# Patient Record
Sex: FEMALE | Race: WHITE
[De-identification: names, ages, dates, MRNs, and addresses within clinical notes are randomized per-mention and may not be internally consistent; named-entity substitution may affect disease eponyms.]

---

## 2017-04-09 ENCOUNTER — HOSPITAL ENCOUNTER (EMERGENCY)
Dept: HOSPITAL 25 - UCCORT | Age: 27
Discharge: HOME | End: 2017-04-09
Payer: SELF-PAY

## 2017-04-09 DIAGNOSIS — N39.0: Primary | ICD-10-CM

## 2017-04-09 DIAGNOSIS — R31.9: ICD-10-CM

## 2017-04-09 DIAGNOSIS — Z88.1: ICD-10-CM

## 2017-04-09 PROCEDURE — 99202 OFFICE O/P NEW SF 15 MIN: CPT

## 2017-04-09 PROCEDURE — 81003 URINALYSIS AUTO W/O SCOPE: CPT

## 2017-04-09 PROCEDURE — G0463 HOSPITAL OUTPT CLINIC VISIT: HCPCS

## 2017-04-09 PROCEDURE — 87086 URINE CULTURE/COLONY COUNT: CPT

## 2017-04-09 NOTE — UC
Complaint Female HPI





- HPI Summary


HPI Summary: 





patient states that monday she worked 12 straight hours and did not urinate the 

entire time, when she got home she voided and there was alot of blood. since 

then she has had burning with urination. sometimes there is visible blood 

sometimes not. no other symtpoms, denies fever or flank pain.





- History Of Current Complaint


Chief Complaint: [facility]


Stated Complaint: URINARY


Time Seen by Provider: 04/09/17 15:44


Hx Obtained From: Patient


Hx Last Menstrual Period: UNKNOWN, POSSIBLY MORE THAN A MONTH AGO.


Onset/Duration: Sudden Onset, Lasting Days


Timing: Intermittent


Severity Currently: Moderate


Character: Dull, Burning


Aggravating Factor(s): Urination





- Risk Factors


Ectopic Pregnancy Risk Factor: Negative


Ovarian Torsion Risk Factor: Negative





- Allergies/Home Medications


Allergies/Adverse Reactions: 


 Allergies











Allergy/AdvReac Type Severity Reaction Status Date / Time


 


Amoxicillin Allergy  Unknown Verified 04/09/17 16:03





   Reaction  





   Details  


 


Clavulanic Acid Allergy  Unknown Verified 04/09/17 16:03





[From Augmentin]   Reaction  





   Details  














PMH/Surg Hx/FS Hx/Imm Hx


Previously Healthy: Yes





- Surgical History


Surgical History: Yes


Surgery Procedure, Year, and Place: EAR TUBES





- Family History


Known Family History: 


   Negative: Hypertension





- Social History


Alcohol Use: Daily


Substance Use Type: Marijuana


Smoking Status (MU): Never Smoked Tobacco





Review of Systems


Constitutional: Negative


Skin: Negative


Eyes: Negative


ENT: Negative


Respiratory: Negative


Cardiovascular: Negative


Gastrointestinal: Negative


Genitourinary: Dysuria, Hematuria


Motor: Negative


Neurovascular: Negative


Musculoskeletal: Negative


Neurological: Negative


Psychological: Negative


All Other Systems Reviewed And Are Negative: Yes





Physical Exam


Triage Information Reviewed: Yes


Appearance: Well-Appearing, Well-Nourished, Pain Distress


Vital Signs: 


 Initial Vital Signs











Temp  98.4 F   04/09/17 15:44


 


Pulse  78   04/09/17 15:44


 


Resp  16   04/09/17 15:44


 


BP  122/82   04/09/17 15:44


 


Pulse Ox  100   04/09/17 15:44











Vital Signs Reviewed: Yes


Eye Exam: Normal


Eyes: Positive: Conjunctiva Clear


ENT Exam: Normal


ENT: Positive: Normal ENT inspection, Hearing grossly normal, Pharynx normal, 

TMs normal


Dental Exam: Normal


Neck exam: Normal


Neck: Positive: Supple, Nontender, No Lymphadenopathy


Respiratory Exam: Normal


Respiratory: Positive: Chest non-tender, Lungs clear, Normal breath sounds


Cardiovascular Exam: Normal


Cardiovascular: Positive: RRR, No Murmur, Pulses Normal


Abdominal Exam: Normal


Abdomen Description: Positive: Nontender, No Organomegaly, Soft - neg cva 

tenderness,


Bowel Sounds: Positive: Present


Musculoskeletal Exam: Normal


Musculoskeletal: Positive: Strength Intact, ROM Intact, No Edema


Neurological Exam: Normal


Psychological Exam: Normal


Skin Exam: Normal





 Complaint Female Dx





- Course


Course Of Treatment: hx obtained, exam performed. meds reviewed, UA positive 

sent for culture. treated with keflex.





- Differential Dx/Diagnosis


Differential Diagnosis/HQI/PQRI: Pelvic Inflammatory Disease, Ureteral Stone, 

Urinary Tract Infection


Provider Diagnoses: UTI.  hematuria





Discharge





- Discharge Plan


Condition: Stable


Disposition: HOME


Prescriptions: 


Cephalexin CAP* [Keflex CAP*] 500 mg PO BID #14 cap


Patient Education Materials:  Urinary Tract Infection in Women (ED)


Additional Instructions: 


1. take the medication as prescribed.


Increase your fluid intake.


Use th pyridium for the next two days.

## 2018-11-19 ENCOUNTER — HOSPITAL ENCOUNTER (EMERGENCY)
Dept: HOSPITAL 25 - UCCORT | Age: 28
Discharge: HOME | End: 2018-11-19
Payer: COMMERCIAL

## 2018-11-19 VITALS — SYSTOLIC BLOOD PRESSURE: 113 MMHG | DIASTOLIC BLOOD PRESSURE: 89 MMHG

## 2018-11-19 DIAGNOSIS — Z88.8: ICD-10-CM
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
## 2018-11-19 DIAGNOSIS — Z88.0: ICD-10-CM

## 2018-11-19 DIAGNOSIS — S16.1XXA: ICD-10-CM
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
## 2018-11-19 DIAGNOSIS — S09.90XA: Primary | ICD-10-CM

## 2018-11-19 PROCEDURE — 70450 CT HEAD/BRAIN W/O DYE: CPT

## 2018-11-19 PROCEDURE — G0463 HOSPITAL OUTPT CLINIC VISIT: HCPCS

## 2018-11-19 PROCEDURE — 99211 OFF/OP EST MAY X REQ PHY/QHP: CPT

## 2018-11-19 PROCEDURE — 72125 CT NECK SPINE W/O DYE: CPT
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
